# Patient Record
Sex: FEMALE | Race: WHITE
[De-identification: names, ages, dates, MRNs, and addresses within clinical notes are randomized per-mention and may not be internally consistent; named-entity substitution may affect disease eponyms.]

---

## 2019-07-17 ENCOUNTER — HOSPITAL ENCOUNTER (OUTPATIENT)
Dept: HOSPITAL 61 - NM | Age: 74
Discharge: HOME | End: 2019-07-17
Attending: INTERNAL MEDICINE
Payer: MEDICARE

## 2019-07-17 DIAGNOSIS — E11.9: ICD-10-CM

## 2019-07-17 DIAGNOSIS — I25.89: Primary | ICD-10-CM

## 2019-07-17 DIAGNOSIS — I10: ICD-10-CM

## 2019-07-17 PROCEDURE — 78452 HT MUSCLE IMAGE SPECT MULT: CPT

## 2019-07-17 PROCEDURE — A9500 TC99M SESTAMIBI: HCPCS

## 2019-07-18 NOTE — RAD
MR#: [id number]

Account#: [id number]

Accession#: [id number]

Date of Study: 07/18/2019

Ordering Physician: NELLY BEAL, 

Referring Physician: DINO TOBIN Tech: TYRA Caro, ARRT (R) (N)





--------------- APPROVED REPORT --------------





Test Type:          Pharmacological

Stress Nurse/Tech: Lakisha Crain R.N.

Test Indications: dyspnea on exertion, diaphoresis

Cardiac History: Family history, Diabetes, Hypertension

Medications:     See Electronic Medical Record

Medical History: See Electronic Medical Record

Resting ECG:     NSR

Resting Heart Rate: 63 bpm

Resting Blood Pressure: 151/73mmHg

Pretest Chest Pain: No chest pain



Nurse/Tech Notes

S1S2, lungs sound clear

Consent: The procedure was explained to the patient in lay terms. Informed consent was witnessed. Myron
eout was entered into [facility]. History and Stress Test performed by Lakisha Crain R.N.



Pharm. Details

Pharmacologic stress testing was performed using 0.4mg per 5ml of regadenoson given intravenously ove
r 7-10 seconds.



Stress Symptoms

No chest pain or symptoms.  heart rate went to 40 right after lincoln injection



POST EXERCISE

Reason for Termination: Infusion complete

Target HR: 125

Max HR: 97 bpm

Max Blood Pressure: 149/76mmHg

Blood Pressure response to exercise: Normal blood pressure response during stress.

Chest Pain: No. 

Arrhythmia: No. 

ST Change: No. 



INTERPRETATION

Stress EKG Conclusion: Baseline EKG showed sinus rhythm.  No ischemic changes at peak stress.  No arr
hythmias.



Imaging Protocol

IMAGE PROTOCOL: Rest Tc-99m/stress Tc-99m 2 days



Rest:            Stress:         Viability:   

Radiopharm.Tc99m TqovkiknmVq97n Sestamibi

Dose30.9mCi            32mCi            

Img Date  07/17/2019 07/18/2019      

Inj-Img Kxev50evp.           60min.           



Rest Admin Site:IV - Right HandAdministrator:TYRA Caro, ARRT (R)(N)

Stress Admin Site: IV - Left AntecubitalAdministrator: RT Elena (R)(N)



STRESS DATA

End Diast. Vol.109.0mlAv. Heart Rate66.0bpm

End Syst. Vol.32.0mlCO Index BSA0.0L/min

Myocardial Tvme072.0gEject. Gbdpovhm09.0%



Stress Rates

Pk. Fill Rate1.62EDV/secLVtime Pk. Fill 178.51msec

Pk. Empty Rate3.57ESV/secLVtime Pk. Stsyd594.33msec

1/3 Pk. Fill1.28EDV/sec



Stress Scores

Regional WT0.00Summed WT0.00

Regional WM0.00Summed WM0.00



LV Perfusion

scintigraphic images showed small-to-moderate reversible defect involving the anteroapical wall consi
stent with ischemia.



Wall Motion

Normal left ventricle systolic function with ejection fraction calculated at 71%.



LV Perf. Quant

17 Seg. SSS6.00

17 Seg. SRS2.00

17 Seg. SDS4.00

Stress Defect Extent (% LAD)31.30Rest Defect Extent (% LAD)0.00Rev. Defect Extent (% LAD)27.50

Stress Defect Extent (% LCX) 0.00Rest Defect Extent (% LCX)13.80Rev. Defect Extent (% LCX)0.00

Stress Defect Extent (% RCA)0.00Rest Defect Extent (% RCA)0.00Rev. Defect Extent (% RCA)0.00

Stress Defect Extent (% ANTONY)13.50Rest Defect Extent (% ANTONY)2.40Rev. Defect Extent (% ANTONY)11.10



Conclusion

1. Regadenoson cardioisotope stress test showed small-to-moderate amount of anteroapical wall ischemi
a.

2. Normal left ventricular systolic function with ejection fraction calculated at 71%.

3. Intermediate risk for cardiac events.



Signed by : Severo Honeycutt, 

Electronically Approved : 07/18/2019 11:38:31

## 2019-09-18 ENCOUNTER — HOSPITAL ENCOUNTER (OUTPATIENT)
Dept: HOSPITAL 61 - SURG | Age: 74
Setting detail: OBSERVATION
LOS: 1 days | Discharge: HOME | End: 2019-09-19
Attending: INTERNAL MEDICINE | Admitting: INTERNAL MEDICINE
Payer: MEDICARE

## 2019-09-18 VITALS — SYSTOLIC BLOOD PRESSURE: 165 MMHG | DIASTOLIC BLOOD PRESSURE: 87 MMHG

## 2019-09-18 VITALS — DIASTOLIC BLOOD PRESSURE: 93 MMHG | SYSTOLIC BLOOD PRESSURE: 172 MMHG

## 2019-09-18 VITALS — DIASTOLIC BLOOD PRESSURE: 92 MMHG | SYSTOLIC BLOOD PRESSURE: 197 MMHG

## 2019-09-18 VITALS — DIASTOLIC BLOOD PRESSURE: 69 MMHG | SYSTOLIC BLOOD PRESSURE: 131 MMHG

## 2019-09-18 VITALS — SYSTOLIC BLOOD PRESSURE: 132 MMHG | DIASTOLIC BLOOD PRESSURE: 64 MMHG

## 2019-09-18 VITALS — WEIGHT: 289.37 LBS | BODY MASS INDEX: 48.21 KG/M2 | HEIGHT: 65 IN

## 2019-09-18 VITALS — DIASTOLIC BLOOD PRESSURE: 67 MMHG | SYSTOLIC BLOOD PRESSURE: 141 MMHG

## 2019-09-18 VITALS — SYSTOLIC BLOOD PRESSURE: 141 MMHG | DIASTOLIC BLOOD PRESSURE: 83 MMHG

## 2019-09-18 VITALS — SYSTOLIC BLOOD PRESSURE: 155 MMHG | DIASTOLIC BLOOD PRESSURE: 85 MMHG

## 2019-09-18 VITALS — SYSTOLIC BLOOD PRESSURE: 131 MMHG | DIASTOLIC BLOOD PRESSURE: 63 MMHG

## 2019-09-18 VITALS — SYSTOLIC BLOOD PRESSURE: 175 MMHG | DIASTOLIC BLOOD PRESSURE: 81 MMHG

## 2019-09-18 VITALS — DIASTOLIC BLOOD PRESSURE: 88 MMHG | SYSTOLIC BLOOD PRESSURE: 166 MMHG

## 2019-09-18 VITALS — SYSTOLIC BLOOD PRESSURE: 172 MMHG | DIASTOLIC BLOOD PRESSURE: 93 MMHG

## 2019-09-18 VITALS — DIASTOLIC BLOOD PRESSURE: 81 MMHG | SYSTOLIC BLOOD PRESSURE: 169 MMHG

## 2019-09-18 VITALS — DIASTOLIC BLOOD PRESSURE: 67 MMHG | SYSTOLIC BLOOD PRESSURE: 126 MMHG

## 2019-09-18 VITALS — DIASTOLIC BLOOD PRESSURE: 91 MMHG | SYSTOLIC BLOOD PRESSURE: 156 MMHG

## 2019-09-18 VITALS — DIASTOLIC BLOOD PRESSURE: 74 MMHG | SYSTOLIC BLOOD PRESSURE: 147 MMHG

## 2019-09-18 VITALS — SYSTOLIC BLOOD PRESSURE: 129 MMHG | DIASTOLIC BLOOD PRESSURE: 72 MMHG

## 2019-09-18 VITALS — DIASTOLIC BLOOD PRESSURE: 76 MMHG | SYSTOLIC BLOOD PRESSURE: 170 MMHG

## 2019-09-18 DIAGNOSIS — R06.00: ICD-10-CM

## 2019-09-18 DIAGNOSIS — I38: ICD-10-CM

## 2019-09-18 DIAGNOSIS — E78.5: ICD-10-CM

## 2019-09-18 DIAGNOSIS — Z79.899: ICD-10-CM

## 2019-09-18 DIAGNOSIS — R60.0: ICD-10-CM

## 2019-09-18 DIAGNOSIS — I25.10: Primary | ICD-10-CM

## 2019-09-18 DIAGNOSIS — Z98.61: ICD-10-CM

## 2019-09-18 DIAGNOSIS — E11.9: ICD-10-CM

## 2019-09-18 DIAGNOSIS — I10: ICD-10-CM

## 2019-09-18 DIAGNOSIS — Z79.84: ICD-10-CM

## 2019-09-18 DIAGNOSIS — Z79.82: ICD-10-CM

## 2019-09-18 LAB
ANION GAP SERPL CALC-SCNC: 9 MMOL/L (ref 6–14)
BUN SERPL-MCNC: 15 MG/DL (ref 7–20)
CALCIUM SERPL-MCNC: 9.4 MG/DL (ref 8.5–10.1)
CHLORIDE SERPL-SCNC: 106 MMOL/L (ref 98–107)
CO2 SERPL-SCNC: 27 MMOL/L (ref 21–32)
CREAT SERPL-MCNC: 0.9 MG/DL (ref 0.6–1)
ERYTHROCYTE [DISTWIDTH] IN BLOOD BY AUTOMATED COUNT: 15.5 % (ref 11.5–14.5)
GFR SERPLBLD BASED ON 1.73 SQ M-ARVRAT: 61.5 ML/MIN
GLUCOSE SERPL-MCNC: 156 MG/DL (ref 70–99)
HCT VFR BLD CALC: 41.9 % (ref 36–47)
HGB BLD-MCNC: 14.1 G/DL (ref 12–15.5)
MCH RBC QN AUTO: 29 PG (ref 25–35)
MCHC RBC AUTO-ENTMCNC: 34 G/DL (ref 31–37)
MCV RBC AUTO: 85 FL (ref 79–100)
PLATELET # BLD AUTO: 128 X10^3/UL (ref 140–400)
POTASSIUM SERPL-SCNC: 4.2 MMOL/L (ref 3.5–5.1)
PROTHROMBIN TIME: 13.4 SEC (ref 11.7–14)
RBC # BLD AUTO: 4.93 X10^6/UL (ref 3.5–5.4)
SODIUM SERPL-SCNC: 142 MMOL/L (ref 136–145)
WBC # BLD AUTO: 4.7 X10^3/UL (ref 4–11)

## 2019-09-18 PROCEDURE — C1892 INTRO/SHEATH,FIXED,PEEL-AWAY: HCPCS

## 2019-09-18 PROCEDURE — 85027 COMPLETE CBC AUTOMATED: CPT

## 2019-09-18 PROCEDURE — C1874 STENT, COATED/COV W/DEL SYS: HCPCS

## 2019-09-18 PROCEDURE — G0378 HOSPITAL OBSERVATION PER HR: HCPCS

## 2019-09-18 PROCEDURE — C1769 GUIDE WIRE: HCPCS

## 2019-09-18 PROCEDURE — 36415 COLL VENOUS BLD VENIPUNCTURE: CPT

## 2019-09-18 PROCEDURE — 92928 PRQ TCAT PLMT NTRAC ST 1 LES: CPT

## 2019-09-18 PROCEDURE — 96376 TX/PRO/DX INJ SAME DRUG ADON: CPT

## 2019-09-18 PROCEDURE — 93005 ELECTROCARDIOGRAM TRACING: CPT

## 2019-09-18 PROCEDURE — 99153 MOD SED SAME PHYS/QHP EA: CPT

## 2019-09-18 PROCEDURE — 80048 BASIC METABOLIC PNL TOTAL CA: CPT

## 2019-09-18 PROCEDURE — G0379 DIRECT REFER HOSPITAL OBSERV: HCPCS

## 2019-09-18 PROCEDURE — C1725 CATH, TRANSLUMIN NON-LASER: HCPCS

## 2019-09-18 PROCEDURE — 96375 TX/PRO/DX INJ NEW DRUG ADDON: CPT

## 2019-09-18 PROCEDURE — 96366 THER/PROPH/DIAG IV INF ADDON: CPT

## 2019-09-18 PROCEDURE — 96365 THER/PROPH/DIAG IV INF INIT: CPT

## 2019-09-18 PROCEDURE — 85610 PROTHROMBIN TIME: CPT

## 2019-09-18 PROCEDURE — C1887 CATHETER, GUIDING: HCPCS

## 2019-09-18 PROCEDURE — 93454 CORONARY ARTERY ANGIO S&I: CPT

## 2019-09-18 PROCEDURE — 99152 MOD SED SAME PHYS/QHP 5/>YRS: CPT

## 2019-09-18 RX ADMIN — METOPROLOL TARTRATE SCH MG: 25 TABLET ORAL at 21:00

## 2019-09-18 NOTE — EKG
[facility]

              [address] [place] [address]

Test Date:    2019               Test Time:    15:47:29

Pat Name:     MICHELLE MCDOWELL            Department:   

Patient ID:   [id number]           Room:         207 1

Gender:       F                        Technician:   

:          1945               Requested By: NELLY BEAL

Order Number: [id number]           Reading MD:   Dong Maria MD

                                 Measurements

Intervals                              Axis          

Rate:         60                       P:            46

[place]:           168                      QRS:          3

QRSD:         82                       T:            43

QT:           436                                    

QTc:          436                                    

                           Interpretive Statements

SINUS RHYTHM



Electronically Signed On 2019 14:29:47 CDT by Dong Maria MD

## 2019-09-18 NOTE — NUR
The patient, MICHELLE MCDOWELL, 72 y/o, F admitted by NELLY BEAL MD, was given written 
information regarding hospital policies, unit procedures and contact persons.  



Patient arrived to unit via bed from cath lab. TR band and arm board in place. NS infusing 
at 50 mls/hr and nitro gtt at 6 mls/hr. No complaints of chest pain. Patient's spouse at 
bedside. Call light within reach. Will continue to monitor.

## 2019-09-18 NOTE — CARD
MR#: [id number]

Account#: [id number]

Accession#: [id number]

Date of Study: 09/18/2019

Ordering Physician: NELLY LOPES, 

Referring Physician: NELLY LOPES, 

Tech: VIKY CONNAURAYJUDY RTR





--------------- APPROVED REPORT --------------





Procedures 

Selective coronary angiogram 

Bare-metal stent placement to the LAD 



The patient is a 73-year-old female with episodes of progressive dyspnea on exertion. Outpatient work
up included a nuclear stress test that showed reversible ischemia in the anterior septal region. Afte
r discussion a cardiac catheterization was recommended. Risks and benefits were discussed with the ashok baires. She agreed to proceed.



After informed consent was obtained the patient was brought to the heart catheterization lab. The are
a of the right radial artery was prepared in the usual manner after an acceptable Gagandeep's test. A [employer]
ck catheter system was used to enter the right radial artery, a wire placed and a 6 German sheath oseas
nicola over the wire. The standard mixture of antispasm medications and heparin was administered. Using 
a J-wire,  a 6 German JL 3.5 catheter was advanced to the ascending aorta. This would not engage the 
left system cleanly and was replaced with a JL 4 diagnostic catheter which was able to engage the ves
moira and sequential injections in various views were obtained. Using an over-the-wire exchange, a 6 Fr
ench JR4 diagnostic catheter was used to engage the right coronary system and sequential injections i
n various views were obtained. Review of the patient's images showed a subtotal lesion in the mid LAD
. He proceeded to revascularize. 



Angiomax as per protocol was administered. A 6 German XB 3.5 guide was used to engage the left system
 but tended to subselectively engage the left circumflex and clean pictures of the LAD were not able 
to be obtained. It was therefore replaced with a 6 German JL4 guide. This was able to more appropriat
ely engage the LAD. The mid subtotal lesion was crossed with a PT choice wire. It was initially dilat
ed with a 3.5 x 15 mm balloon with a series of 6 inflations to a maximum pressure of 8 kristie for maximu
m time of 15 seconds. Following this a 2.75 x 18 resolute kimberly drug-eluting stent was deployed with o
ne inflation at 16 kristie for 16 seconds. Residual lesion was 0%. The wire and guiding system were remov
ed from the patient. The sheath was removed and the site sealed with a TR band. The patient was moved
 to the holding area in stable condition.



Findings.

Hemodynamics. Aortic root pressure of 164/88.

Coronaries.

Left main. The left main was a very large vessel with a mid 15% lesion.

Left anterior descending. The LAD was a moderate size vessel. It had a very proximal 10-15% lesion. F
ollowing this there appeared to be a previously placed widely patent stent. In its midportion the LAD
 had a subtotal lesion.

Left circumflex. The left circumflex was a very large and dominant vessel. It had a mid to distal les
ion of 15%.

Right coronary artery. The right coronary was a moderate to moderately small vessel. It had a proxima
l 25-30% lesion.







<Conclusion>

Severe single-vessel coronary disease with a subtotal lesion in the mid LAD.

Mild disease in the left circumflex and right coronary arteries.

Successful bare metal stent placement to the mid LAD with a residual lesion of 0%.



Signed by : Nelly Lopes MD

Electronically Approved : 09/18/2019 15:29:19

## 2019-09-19 VITALS — DIASTOLIC BLOOD PRESSURE: 61 MMHG | SYSTOLIC BLOOD PRESSURE: 132 MMHG

## 2019-09-19 VITALS — SYSTOLIC BLOOD PRESSURE: 163 MMHG | DIASTOLIC BLOOD PRESSURE: 76 MMHG

## 2019-09-19 VITALS — SYSTOLIC BLOOD PRESSURE: 158 MMHG | DIASTOLIC BLOOD PRESSURE: 74 MMHG

## 2019-09-19 VITALS — DIASTOLIC BLOOD PRESSURE: 81 MMHG | SYSTOLIC BLOOD PRESSURE: 176 MMHG

## 2019-09-19 VITALS — DIASTOLIC BLOOD PRESSURE: 69 MMHG | SYSTOLIC BLOOD PRESSURE: 164 MMHG

## 2019-09-19 VITALS — DIASTOLIC BLOOD PRESSURE: 90 MMHG | SYSTOLIC BLOOD PRESSURE: 206 MMHG

## 2019-09-19 RX ADMIN — METOPROLOL TARTRATE SCH MG: 25 TABLET ORAL at 08:43

## 2019-09-19 NOTE — DISCH
DISCHARGE INSTRUCTIONS


Condition on Discharge


Condition on Discharge:  Stable





Activity After Discharge


Activity Instructions for Disc:  Activity as tolerated


Bathing Instructions:  Shower-keep dressing dry, No Tub Bath until see  (for 

2 weeks)


Lifting Instructions after Dis:  No heavy lifting, No pulling or pushing, Do not

lift >10 pounds


Driving Instructions after Dis:  Do not drive (for 5 days )


Weight Bearing Status after Di:  As tolerated (see dignosis specific instru

ctions)





Diet after Discharge


Diet after Discharge:  Cardiac





Contacting the  after DC


Call your doctor for:  Concerns you may have











RICH MOHAMUD           Sep 19, 2019 17:02

## 2019-09-19 NOTE — EKG
[facility]

              [address] [place] [address]

Test Date:    2019               Test Time:    07:15:38

Pat Name:     MICHELLE MCDOWELL            Department:   

Patient ID:   [id number]           Room:         207 

Gender:       F                        Technician:   SJ

:          1945               Requested By: NELLY BEAL

Order Number: [id number]           Reading MD:   Severo Honeycutt

                                 Measurements

Intervals                              Axis          

Rate:         58                       P:            59

[place]:           166                      QRS:          14

QRSD:         86                       T:            64

QT:           452                                    

QTc:          443                                    

                           Interpretive Statements

SINUS RHYTHM

ATRIAL PREMATURE COMPLEX(ES)



Electronically Signed On 10-1-2019 16:07:58 CDT by Severo Honeycutt

## 2019-09-19 NOTE — PDOC3
Discharge Summary


Visit Information


Date of Admission:  Sep 18, 2019


Date of Discharge:  Sep 19, 2019


Admitting Diagnosis Comment:


Dyspnea upon exertion 


Hypertension


Hyperlipidemia 


Diabetes, II


Abnormal MPI


Final Diagnosis


CAD


Hypertension


Hyperlipidemia


Diabetes, II





Brief Hospital Course


Allergies





                                    Allergies








Coded Allergies Type Severity Reaction Last Updated Verified


 


  No Known Drug Allergies    7/17/19 No








Vital Signs





Vital Signs








  Date Time  Temp Pulse Resp B/P (MAP) Pulse Ox O2 Delivery O2 Flow Rate FiO2


 


9/19/19 15:38  81  176/81    


 


9/19/19 15:00 98.1  18  96 Room Air  





 98.1       


 


9/19/19 08:00       2.0 








Lab Results





Laboratory Tests








Test


 9/18/19


10:12


 


White Blood Count


 4.7 x10^3/uL


(4.0-11.0)


 


Red Blood Count


 4.93 x10^6/uL


(3.50-5.40)


 


Hemoglobin


 14.1 g/dL


(12.0-15.5)


 


Hematocrit


 41.9 %


(36.0-47.0)


 


Mean Corpuscular Volume 85 fL () 


 


Mean Corpuscular Hemoglobin 29 pg (25-35) 


 


Mean Corpuscular Hemoglobin


Concent 34 g/dL


(31-37)


 


Red Cell Distribution Width


 15.5 %


(11.5-14.5)


 


Platelet Count


 128 x10^3/uL


(140-400)


 


Prothrombin Time


 13.4 SEC


(11.7-14.0)


 


Prothromb Time International


Ratio 1.1 (0.8-1.1) 





 


Sodium Level


 142 mmol/L


(136-145)


 


Potassium Level


 4.2 mmol/L


(3.5-5.1)


 


Chloride Level


 106 mmol/L


()


 


Carbon Dioxide Level


 27 mmol/L


(21-32)


 


Anion Gap 9 (6-14) 


 


Blood Urea Nitrogen


 15 mg/dL


(7-20)


 


Creatinine


 0.9 mg/dL


(0.6-1.0)


 


Estimated GFR


(Cockcroft-Gault) 61.5 





 


Glucose Level


 156 mg/dL


(70-99)


 


Calcium Level


 9.4 mg/dL


(8.5-10.1)








Brief Hospital Course


Ms. Clarke  is a 73 old female who presented to our office with complaints of 

progressive dyspnea upon exertion. Underwent stress test that was notable for a 

small-to-moderate amount of anteroapical wall ischemia. Patient was brought el

ectively for cardiac catheterization, which revealed severe single-vessel 

coronary disease with a subtotal lesion in the mid LAD along with mild disease 

in the left circumflex and right coronary arteries. Underwent successful bare 

metal stent placement to the mid LAD with a residual lesion of 0%. Tolerated 

overnight without acute complications. Blood pressure elevated post-procedure 

and was controlled with nitro gtt. Nitro gtt titrated off following addition or 

oral hypertensives. See med list below for details. No acute events on 

telemetry. Right radial arteriotomy site soft, clean, and dry. No hematoma. 

Bilateral neurovascular status intact. Lungs CTA. Patient discharged in stable 

condition following blood pressure control.





Discharge Information


Condition at Discharge:  Improved


Follow Up:  Weeks (4 weeks with Dr. Nguyen )


Disposition/Orders:  D/C to Home


Scheduled


Aspirin (Aspirin) 81 Mg Tab.chew, 1 TAB PO DAILY for  , #30 Ref 3 (Reported)


   Entered as Reported by: MICHELLE MG RN on 9/18/19 1047


   Last Taken: Unknown Dose on 9/18/19      Last Action: New Order on 9/18/19 1047 by MICHELLE MG RN


Black Cohosh Root Extract (Black Cohosh) 160 Mg Capsule, 160 MG PO DAILY for 

supplement, (Reported)


   Entered as Reported by: MICHELLE MG RN on 9/18/19 1047


   Last Taken: Unknown Dose on 9/18/19      Last Action: New Order on 9/18/19 1047 by MICHELLE MG RN


Calcium Carbonate (Calcium) 500 Mg Tablet, 500 MG PO BID for supplement, 

(Reported)


   Entered as Reported by: MICHELLE MG RN on 9/18/19 1047


   Last Taken: Unknown Dose on 9/18/19      Last Action: New Order on 9/18/19 1047 by MICHELLE MG RN


Cholecalciferol (Vitamin D3) (D3-50) 50,000 Unit Capsule, 1,000 UNIT PO DAILY 

for supplement, (Reported)


   Entered as Reported by: MICHELLE MG RN on 9/18/19 1047


   Last Taken: Unknown Dose on 9/18/19      Last Action: New Order on 9/18/19 1047 by MICHELLE MG RN


Glipizide (Glipizide) 5 Mg Tablet, 0.5 TAB PO DAILY for diabetes, #60 Ref 3 

(Reported)


   Entered as Reported by: MICHELLE MG RN on 9/18/19 1047


   Last Taken: Unknown Dose on 9/17/19      Last Action: New Order on 9/18/19 1047 by MICHELLE MG RN


Glucosamine Sulfate 2KCL (Glucosamine) 1,000 Mg Tablet, 1,000 MG PO DAILY for 

supplement, (Reported)


   Entered as Reported by: MICHELLE MG RN on 9/18/19 1047


   Last Taken: Unknown Dose on 9/17/19      Last Action: New Order on 9/18/19 1047 by MICHELLE MG RN


Levothyroxine Sodium (Levothyroxine Sodium) 88 Mcg Tablet, 1 TAB PO DAILY for 

thyroid, #30 Ref 5 (Reported)


   Entered as Reported by: MICHELLE MG RN on 9/18/19 1047


   Last Taken: Unknown Dose on 9/18/19      Last Action: New Order on 9/18/19 1047 by MICHELLE MG RN


Losartan Potassium (Losartan Potassium **) 25 Mg Tablet, 25 MG PO DAILY for 

HYPERTENSION, (Reported)


   Entered as Reported by: MICHELLE MG RN on 9/18/19 1047


   Last Taken: Unknown Dose on 9/18/19      Last Action: New Order on 9/18/19 1047 by MICHELLE MG RN


Lovastatin (Lovastatin) 40 Mg Tablet, 1 TAB PO DAILY for statin, #30 Ref 5 

(Reported)


   Entered as Reported by: MICHELLE MG RN on 9/18/19 1047


   Last Taken: Unknown Dose on 9/18/19      Last Action: New Order on 9/18/19 1047 by MICHELLE MG RN


Naproxen Sodium (Aleve) 220 Mg Capsule, 220 MG PO BID for pain, (Reported)


   Entered as Reported by: MICHELLE MG RN on 9/18/19 1047


   Last Taken: Unknown Dose on 9/18/19      Last Action: New Order on 9/18/19 1047 by MICHELLE MG RN





Patient Instructions


Patient Instructions


GENERAL INSTRUCTIONS:





1.   Your dressing should be removed prior to leaving the hospital.


2.   It is OK to shower the day after your procedure.


3.   If you received stents, be sure to carry your stent information card with 

you in your wallet/purse at all times.


4.   Call the office immediately at [phone] if you notice any fever or if 

there is redness, worsening tenderness/pain, increased bruising, or drainage    

                             from the puncture site.    


5.   Should you have bleeding from the site, lie down immediately & put pressure

on the site.  The pressure should be hard enough to stop the bleeding. 


       Have the nearest person call 911.  DO NOT try to drive to the ER with 

active bleeding.   


6.   If you notice a change in color, coolness to touch, or loss of feeling in 

the affected extremity, come to the emergency room.  Please have someone  


      drive you or call 911 if no one is available.  DO NOT drive yourself.


7.   If you normally take glucophage (metformin), please do not take this 

medicine for 48 hours following your procedure.


8.   DO NOT STOP TAKING YOUR PLAVIX OR ASPIRIN UNLESS IT IS CLEARED BY A 

REPRESENTATIVE OF YOUR CARDIOLOGIST AT OUR  


      OFFICE.


9.   QUIT SMOKING: the American Heart Association, American Lung Association, & 

American Cancer Society have cessation resources available on  


      their websites


10.   Please have someone available to drive you home from the hospital as you 

may be limited by sedation medications given during the procedure.





Femoral (Groin) access:


1.   Do no lifting, pushing, pulling, bending, stooping, or recurrent stair 

climbing for 3 days following your procedure.


2.   Once past the first 3 days, do not do any HEAVY exertion or lifting for one

week following the procedure.  No gym workouts, running, lifting greater  


      than a gallon of milk, etc


3.   Do not submerge in bath or pool for one week.


       OK to drive 3 days following your procedure, but if going long distance, 

do not go alone & take hourly breaks to get out of car and walk around.





Radial Artery (Wrist) access:


1.   No pushing, pulling, lifting, typing, or anything that requires repetitive 

use/movement of the affected wrist for 3 days following your procedure.


2.   OK to drive the day following your procedure. (This is because of effects 

of sedating medications.)





Call the office at [phone] for any questions or concerns.











RICH MOHAMUD           Sep 19, 2019 17:14

## 2019-09-19 NOTE — NUR
Discharge Note:



MICHELLE MCDOWELL [address] [place] [address]



Discharge instructions and discharge home medications reviewed with Patient and a copy 
given. All questions have been answered and understanding verbalized. 



The following instructions and handouts were given: Heart cath with stent - after care



Discontinued IV line



Patient discharged to home with self care via wheelchair

## 2021-02-13 ENCOUNTER — HOSPITAL ENCOUNTER (EMERGENCY)
Dept: HOSPITAL 61 - ER | Age: 76
LOS: 1 days | Discharge: HOME | End: 2021-02-14
Payer: MEDICARE

## 2021-02-13 VITALS — HEIGHT: 65 IN | WEIGHT: 270.51 LBS | BODY MASS INDEX: 45.07 KG/M2

## 2021-02-13 DIAGNOSIS — L03.116: Primary | ICD-10-CM

## 2021-02-13 LAB
ALBUMIN SERPL-MCNC: 2.9 G/DL (ref 3.4–5)
ALBUMIN/GLOB SERPL: 0.9 {RATIO} (ref 1–1.7)
ALP SERPL-CCNC: 80 U/L (ref 46–116)
ALT SERPL-CCNC: 54 U/L (ref 14–59)
ANION GAP SERPL CALC-SCNC: 8 MMOL/L (ref 6–14)
AST SERPL-CCNC: 41 U/L (ref 15–37)
BASOPHILS # BLD AUTO: 0 X10^3/UL (ref 0–0.2)
BASOPHILS NFR BLD: 0 % (ref 0–3)
BILIRUB SERPL-MCNC: 0.6 MG/DL (ref 0.2–1)
BUN SERPL-MCNC: 14 MG/DL (ref 7–20)
BUN/CREAT SERPL: 14 (ref 6–20)
CALCIUM SERPL-MCNC: 9.2 MG/DL (ref 8.5–10.1)
CHLORIDE SERPL-SCNC: 100 MMOL/L (ref 98–107)
CO2 SERPL-SCNC: 26 MMOL/L (ref 21–32)
CREAT SERPL-MCNC: 1 MG/DL (ref 0.6–1)
EOSINOPHIL NFR BLD: 0.1 X10^3/UL (ref 0–0.7)
EOSINOPHIL NFR BLD: 1 % (ref 0–3)
ERYTHROCYTE [DISTWIDTH] IN BLOOD BY AUTOMATED COUNT: 16.3 % (ref 11.5–14.5)
GFR SERPLBLD BASED ON 1.73 SQ M-ARVRAT: 54.2 ML/MIN
GLUCOSE SERPL-MCNC: 211 MG/DL (ref 70–99)
HCT VFR BLD CALC: 36.5 % (ref 36–47)
HGB BLD-MCNC: 12.3 G/DL (ref 12–15.5)
LYMPHOCYTES # BLD: 0.6 X10^3/UL (ref 1–4.8)
LYMPHOCYTES NFR BLD AUTO: 7 % (ref 24–48)
MCH RBC QN AUTO: 28 PG (ref 25–35)
MCHC RBC AUTO-ENTMCNC: 34 G/DL (ref 31–37)
MCV RBC AUTO: 84 FL (ref 79–100)
MONO #: 0.5 X10^3/UL (ref 0–1.1)
MONOCYTES NFR BLD: 6 % (ref 0–9)
NEUT #: 7.4 X10^3/UL (ref 1.8–7.7)
NEUTROPHILS NFR BLD AUTO: 87 % (ref 31–73)
PLATELET # BLD AUTO: 105 X10^3/UL (ref 140–400)
POTASSIUM SERPL-SCNC: 3.6 MMOL/L (ref 3.5–5.1)
PROT SERPL-MCNC: 6.3 G/DL (ref 6.4–8.2)
RBC # BLD AUTO: 4.34 X10^6/UL (ref 3.5–5.4)
SODIUM SERPL-SCNC: 134 MMOL/L (ref 136–145)
WBC # BLD AUTO: 8.6 X10^3/UL (ref 4–11)

## 2021-02-13 PROCEDURE — 93971 EXTREMITY STUDY: CPT

## 2021-02-13 PROCEDURE — 93005 ELECTROCARDIOGRAM TRACING: CPT

## 2021-02-13 PROCEDURE — 99285 EMERGENCY DEPT VISIT HI MDM: CPT

## 2021-02-13 PROCEDURE — 87040 BLOOD CULTURE FOR BACTERIA: CPT

## 2021-02-13 PROCEDURE — 85007 BL SMEAR W/DIFF WBC COUNT: CPT

## 2021-02-13 PROCEDURE — 85025 COMPLETE CBC W/AUTO DIFF WBC: CPT

## 2021-02-13 PROCEDURE — 36415 COLL VENOUS BLD VENIPUNCTURE: CPT

## 2021-02-13 PROCEDURE — 73630 X-RAY EXAM OF FOOT: CPT

## 2021-02-13 PROCEDURE — 80053 COMPREHEN METABOLIC PANEL: CPT

## 2021-02-13 PROCEDURE — 96374 THER/PROPH/DIAG INJ IV PUSH: CPT

## 2021-02-13 PROCEDURE — 85379 FIBRIN DEGRADATION QUANT: CPT

## 2021-02-13 NOTE — RAD
Exam: Left foot 3 views



INDICATION: Erythema



TECHNIQUE: Frontal, lateral and oblique views of the left foot



Comparisons: None



FINDINGS:

Diffuse soft tissue swelling at the ankle and foot. Bone mineralization is normal. No acute or healed
 fractures. Joint spaces are well-maintained. 



IMPRESSION:

Few soft tissue swelling at the ankle and foot without underlying osseous abnormality identified. Cor
relate for cellulitis.



Electronically signed by: Santino Smyth MD (2/13/2021 9:44 PM) DANICA

## 2021-02-13 NOTE — PHYS DOC
Past Medical History


Smoking Status:  Never Smoker





Adult General


Chief Complaint


Chief Complaint:  LOWER EXTREMITY EDEMA





HPI


HPI





Patient is a 75  year old with a known past medical history includes 

hyperlipidemia, hypertension and diabetes now presents emergency department 

complaining of new onset of left lower extremity pain and swelling.  Patient 

states that she has been having difficulty with what appears to be an infected 

left great toe for the last 2 weeks.  Over the last 48 hours is developed 

worsening sensation of pain and swelling surrounding the entire left lower 

extremity with correlated erythema.  Patient states that she spoke to her 

primary care physician today who recommended her come to the emergency 

department for evaluation.  Patient denies any associated fever, chills, nausea,

vomiting, chest pain or shortness of breath.





Review of Systems


Review of Systems





Constitutional: Denies fever or chills []


Eyes: Denies change in visual acuity, redness, or eye pain []


HENT: Denies nasal congestion or sore throat []


Respiratory: Denies cough or shortness of breath []


Cardiovascular: No additional information not addressed in HPI []


GI: Denies abdominal pain, nausea, vomiting, bloody stools or diarrhea []


:  Denies dysuria or hematuria []


Musculoskeletal: Denies back pain or joint pain []


Integument: Denies rash or skin lesions []


Neurologic: Denies headache, focal weakness or sensory changes []


Endocrine: Denies polyuria or polydipsia []





All other systems were reviewed and found to be within normal limits, except as 

documented in this note.





Current Medications


Current Medications





Current Medications








 Medications


  (Trade)  Dose


 Ordered  Sig/Leana  Start Time


 Stop Time Status Last Admin


Dose Admin


 


 Ceftriaxone Sodium


  (Rocephin)  1 gm  1X  ONCE  2/13/21 21:30


 2/13/21 21:31 DC 2/13/21 21:30


1 GM











Allergies


Allergies





Allergies








Coded Allergies Type Severity Reaction Last Updated Verified


 


  No Known Drug Allergies    7/17/19 No











Physical Exam


Physical Exam





Constitutional: Well developed, well nourished, no acute distress, non-toxic 

appearance. []


HENT: Normocephalic, atraumatic, bilateral external ears normal, oropharynx 

moist, no oral exudates, nose normal. []


Eyes: PERRLA, EOMI, conjunctiva normal, no discharge. [] 


Neck: Normal range of motion, no tenderness, supple, no stridor. [] 


Cardiovascular:Heart rate regular rhythm, no murmur []


Lungs & Thorax:  Bilateral breath sounds clear to auscultation []


Abdomen: Bowel sounds normal, soft, no tenderness, no masses, no pulsatile 

masses. [] 


Skin: Warm, dry, no erythema, no rash. [] 


Back: No tenderness, no CVA tenderness. [] 


Extremities: Significant left lower extremity tenderness] to the mid foreleg 

with erythema, no cyanosis, no clubbing, ROM intact, no edema. [] 


Neurologic: Alert and oriented X 3, normal motor function, normal sensory 

function, no focal deficits noted. []


Psychologic: Affect normal, judgement normal, mood normal. []





Current Patient Data


Lab Values





                                Laboratory Tests








Test


 2/13/21


21:22


 


White Blood Count


 8.6 x10^3/uL


(4.0-11.0)


 


Red Blood Count


 4.34 x10^6/uL


(3.50-5.40)


 


Hemoglobin


 12.3 g/dL


(12.0-15.5)


 


Hematocrit


 36.5 %


(36.0-47.0)


 


Mean Corpuscular Volume


 84 fL ()





 


Mean Corpuscular Hemoglobin 28 pg (25-35)  


 


Mean Corpuscular Hemoglobin


Concent 34 g/dL


(31-37)


 


Red Cell Distribution Width


 16.3 %


(11.5-14.5)  H


 


Platelet Count


 105 x10^3/uL


(140-400)  L


 


Neutrophils (%) (Auto) 87 % (31-73)  H


 


Lymphocytes (%) (Auto) 7 % (24-48)  L


 


Monocytes (%) (Auto) 6 % (0-9)  


 


Eosinophils (%) (Auto) 1 % (0-3)  


 


Basophils (%) (Auto) 0 % (0-3)  


 


Neutrophils # (Auto)


 7.4 x10^3/uL


(1.8-7.7)


 


Lymphocytes # (Auto)


 0.6 x10^3/uL


(1.0-4.8)  L


 


Monocytes # (Auto)


 0.5 x10^3/uL


(0.0-1.1)


 


Eosinophils # (Auto)


 0.1 x10^3/uL


(0.0-0.7)


 


Basophils # (Auto)


 0.0 x10^3/uL


(0.0-0.2)


 


Segmented Neutrophils % 75 % (35-66)  H


 


Band Neutrophils % 12 % (0-9)  H


 


Lymphocytes % 7 % (24-48)  L


 


Monocytes % 5 % (0-10)  


 


Basophils % 1 % (0-3)  


 


Platelet Estimate


 Decreased


(ADEQUATE)


 


D-Dimer (Cecilia)


 1.15 ug/mlFEU


(0.00-0.50)  H


 


Sodium Level


 134 mmol/L


(136-145)  L


 


Potassium Level


 3.6 mmol/L


(3.5-5.1)


 


Chloride Level


 100 mmol/L


()


 


Carbon Dioxide Level


 26 mmol/L


(21-32)


 


Anion Gap 8 (6-14)  


 


Blood Urea Nitrogen


 14 mg/dL


(7-20)


 


Creatinine


 1.0 mg/dL


(0.6-1.0)


 


Estimated GFR


(Cockcroft-Gault) 54.2  





 


BUN/Creatinine Ratio 14 (6-20)  


 


Glucose Level


 211 mg/dL


(70-99)  H


 


Calcium Level


 9.2 mg/dL


(8.5-10.1)


 


Total Bilirubin


 0.6 mg/dL


(0.2-1.0)


 


Aspartate Amino Transferase


(AST) 41 U/L (15-37)


H


 


Alanine Aminotransferase (ALT)


 54 U/L (14-59)





 


Alkaline Phosphatase


 80 U/L


()


 


Total Protein


 6.3 g/dL


(6.4-8.2)  L


 


Albumin


 2.9 g/dL


(3.4-5.0)  L


 


Albumin/Globulin Ratio


 0.9 (1.0-1.7)


L





                                Laboratory Tests


2/13/21 21:22








                                Laboratory Tests


2/13/21 21:22











EKG


EKG


[]





Radiology/Procedures


Radiology/Procedures


[]





Course & Med Decision Making


Course & Med Decision Making


Pertinent Labs and Imaging studies reviewed. (See chart for details)





75F presenting with new onset of left lower extremity pain and swelling is most 

consistent with acute cellulitis.  Will obtain labs to determine the severity of

 the and initiate treatment with IV antibiotics and obtain an x-ray to make sure

 there is no evidence of osteomyelitis.  DVT is also in the differential but is 

less likely, will obtain a D-dimer to rule it out.





Ddimer positive. No signficant leukocytosis. US obtained negative for DVT. Will 

discharge patient on outpatient course of PO abx





Dragon Disclaimer


Dragon Disclaimer


This electronic medical record was generated, in whole or in part, using a voice

 recognition dictation system.





Departure


Departure


Impression:  


   Primary Impression:  


   Cellulitis


Disposition:  01 DC HOME SELF CARE/HOMELESS


Condition:  GOOD


Referrals:  


INOCENCIO LAI MD (PCP)


Patient Instructions:  Cellulitis





Additional Instructions:  


EMERGENCY DEPARTMENT GENERAL DISCHARGE INSTRUCTIONS





Thank you for coming to [facility] Emergency Department (ED) 

today and 


trusting us with you care.  We trust that you had a positive experience in our 

Emergency 


Department.  If you wish to speak to the department management, you may call the

 Director at 


[phone].





YOUR FOLLOW UP INSTRUCTIONS ARE AS FOLLOWS:





1.  Do you have a private Doctor?  If you do not have a private doctor, please 

ask for a 


resource list of physicians or clinics that may be able to assist you with 

follow up care.





2.  The Emergency Physicain has interpreted your x-rays.  The X-Ray specialist 

will also 


review them.  If there is a change in the findings, you will be notified in 48 

hours when at 


all possible.





3.  A lab test or culture has been done, your results will be reviewed and you 

will be 


notified if you need a change in treatment.





ADDITIONAL INSTRUCTIONS AND INFORMATION:





1.  Your care today has been supervised by a physician who is specially trained 

in emergency 


care.  Many problems require more than one evaluation for a complete diagnosis a

nd 


treatment.  We recommend that you schedule your follow up appointment as 

recommended to 


ensure complete treatment of you illness or injury.  If you are unable to obtain

 follow up 


care and continue to have a problem, or if your condition worsens, we recommend 

that you 


return to the ED.





2.  We are not able to safely determine your condition over the phone nor are we

 able to 


give sound medical advice over the phone.  For these safety reasons, if you call

 for medical 


advice we will ask you to come to the ED for further evaluation.





3.  If you have any questions regarding these discharge instructions please call

 the ED at 


[phone].





SAFETY INFORMATION:





In the interest of safety, wellness, and injury prevention; we encourage you to 

wear your 


sealbelt, if you smoke; quite smoking, and we encourage family to use a 

protective helmet 


for bicycling and other sporting events that present an increased risk for head 

injury.





IF YOUR SYMPTOMS WORSEN OR NEW SYMPTOMS DEVELOP, OR YOU HAVE CONCERNS ABOUT YOUR

 CONDITION; 


OR IF YOUR CONDITION WORSENS WHILE YOU ARE WAITING FOR YOUR FOLLOW UP 

APPOINTMENT; EITHER 


CONTACT YOUR PRIMARY CARE DOCTOR, THE PHYSICIAN WHOSE NAME AND NUMBER YOU WERE 

GIVEN, OR 


RETURN TO THE ED IMMEDIATELY.


Scripts


Cephalexin (CEPHALEXIN) 500 Mg Tablet


1 TAB PO QID for 7 Days, #28 TAB


   Prov: ANKIT GAYTAN MD         2/14/21











ANKIT GAYTAN MD              Feb 13, 2021 21:24

## 2021-02-14 VITALS — DIASTOLIC BLOOD PRESSURE: 76 MMHG | SYSTOLIC BLOOD PRESSURE: 133 MMHG

## 2021-02-14 LAB
% BANDS: 12 % (ref 0–9)
% LYMPHS: 7 % (ref 24–48)
% MONOS: 5 % (ref 0–10)
% SEGS: 75 % (ref 35–66)
BASOPHILS NFR BLD AUTO: 1 % (ref 0–3)
PLATELET # BLD EST: (no result) 10*3/UL

## 2021-02-14 NOTE — EKG
[facility]

              [address] [place] [address]

Test Date:    2021               Test Time:    22:24:39

Pat Name:     MICHELLE MCDOWELL            Department:   

Patient ID:   [id number]           Room:          

Gender:       F                        Technician:   

:          1945               Requested By: ANKIT GAYTAN

Order Number: [id number]           Reading MD:     

                                 Measurements

Intervals                              Axis          

Rate:         68                       P:            56

KS:           148                      QRS:          1

QRSD:         82                       T:            48

QT:           382                                    

QTc:          406                                    

                           Interpretive Statements

SINUS RHYTHM

NORMAL ECG

RI6.01

No previous ECG available for comparison

## 2021-02-14 NOTE — RAD
Left lower extremity venous duplex study 2/14/2021



Clinical History: Left leg swelling.



Technique: Using a combination of real time ultrasound imaging and color-flow and pulse Doppler imagi
ng techniques along with graded compression and augmentation, duplex evaluation of the deep venous sy
stem of the left lower extremity was performed. Multiple images were obtained.



Findings: There is no sonographic evidence of deep venous thrombosis involving the visualized deep ve
nous structures of the left lower extremity.



Impression: Negative study.



Electronically signed by: Barney Kat MD (2/14/2021 2:15 AM) [id number]

## 2021-02-19 VITALS — SYSTOLIC BLOOD PRESSURE: 106 MMHG | DIASTOLIC BLOOD PRESSURE: 67 MMHG

## 2021-07-28 ENCOUNTER — HOSPITAL ENCOUNTER (OUTPATIENT)
Dept: HOSPITAL 61 - US | Age: 76
End: 2021-07-28
Attending: INTERNAL MEDICINE
Payer: MEDICARE

## 2021-07-28 DIAGNOSIS — R60.0: ICD-10-CM

## 2021-07-28 DIAGNOSIS — I70.203: Primary | ICD-10-CM

## 2021-07-28 PROCEDURE — 93925 LOWER EXTREMITY STUDY: CPT

## 2021-07-28 PROCEDURE — 93970 EXTREMITY STUDY: CPT

## 2021-07-28 NOTE — RAD
MR#: [id number]

Account#: [id number]

Accession#: [id number]

Date of Study: 07/28/2021

Ordering Physician: NELLY BEAL, 

Referring Physician: NELLY BEAL, 

Tech: Leoncio Kumar MBA, RDMS, RVT, RDCS, RTR





--------------- APPROVED REPORT --------------





Patient Location : OUT-PATIENT



Indications

Lower Extremity Edema :     Bilateral



Findings

Limited grayscale images the bilateral saphenofemoral junctions are grossly unremarkable.  Spectral w
aveforms and color Doppler not reveal any obvious evidence of reflux in the bilateral greater and les
ser saphenous veins.



The right great saphenous vein measures approximately 4 mm in the left great saphenous vein measures 
approximately 6 mm.



Critical Notification

Critical Value: No



<Conclusion>

1.  Negative for reflux in the bilateral greater and lesser saphenous veins



Signed by : Dong Maria, 

Electronically Approved : 07/28/2021 15:12:53

## 2021-07-28 NOTE — RAD
MR#: [id number]

Account#: [id number]

Accession#: [id number]

Date of Study: 07/28/2021

Ordering Physician: NELLY BEAL, 

Referring Physician: NELLY BEAL, 

Tech: Leoncio Kumar MBA, RDMS, RVT, RDCS, RTR





--------------- APPROVED REPORT --------------





Patient Location: OUT-PATIENT



Indications

Rest Pain:Bilaterally                         

Edema                                          



VELOCITY AND DOPPLER WAVEFORM ANALYSIS

RIGHT cm/secWaveformSeverity LEFT cm/secWaveform Severity 

dCFA 141.0TriphasicdCFA 134.0Triphasic

Prof Fem Art. 54.0BiphasicProf Fem Art. 61.0Biphasic

Fem Art Prox. 128.0TriphasicFem Art Prox. 117.0Triphasic

Fem Art Mid. 122.0TriphasicFem Art Mid. 124.0Triphasic

Fem Art Dist. 116.0TriphasicFem Art Dist. 131.0Triphasic

Pop Art(Fossa) 109.0TriphasicPop Art(AK) 105.0Triphasic

PTA Prox. 75.0TriphasicPTA Prox. 88.0Triphasic

PTA Dist. 56.0TriphasicPTA Dist. 101.0Triphasic

Per Art Mid. 66.0TriphasicPer Art Mid. 47.0Triphasic

RADHA Prox. 77.0TriphasicATA Prox. 78.0Triphasic

DPA 97TriphasicDPA 51Triphasic



Findings

Grayscale images the bilateral lower extremity arterial vessels demonstrate mild diffuse atherosclero
sis.



Spectral waveforms and color Doppler are grossly within normal limits without any focal high-grade st
enosis identified.  There are triphasic and biphasic waveforms throughout with three-vessel runoff be
low the knee.



Critical Notification

Critical Value: No



<Conclusion>

1.  No significant lower extremity arterial disease bilaterally



Signed by : Dong Maria, 

Electronically Approved : 07/28/2021 15:13:55

## 2022-05-29 ENCOUNTER — HOSPITAL ENCOUNTER (EMERGENCY)
Dept: HOSPITAL 61 - ER | Age: 77
Discharge: HOME | End: 2022-05-29
Payer: MEDICARE

## 2022-05-29 VITALS — WEIGHT: 288.81 LBS | HEIGHT: 63 IN | BODY MASS INDEX: 51.17 KG/M2

## 2022-05-29 VITALS — SYSTOLIC BLOOD PRESSURE: 124 MMHG | DIASTOLIC BLOOD PRESSURE: 73 MMHG

## 2022-05-29 DIAGNOSIS — E78.00: ICD-10-CM

## 2022-05-29 DIAGNOSIS — E11.9: ICD-10-CM

## 2022-05-29 DIAGNOSIS — E03.9: ICD-10-CM

## 2022-05-29 DIAGNOSIS — I25.10: ICD-10-CM

## 2022-05-29 DIAGNOSIS — I10: ICD-10-CM

## 2022-05-29 DIAGNOSIS — L03.032: Primary | ICD-10-CM

## 2022-05-29 PROCEDURE — 99284 EMERGENCY DEPT VISIT MOD MDM: CPT

## 2022-05-29 NOTE — PHYS DOC
Past Medical History


Past Medical History:  CAD, Diabetes-Type II, High Cholesterol, Hypertension, 

Hypothyroid


Additional Past Medical Histor:  Podiatry ISSUES WITH FEET/TOES


Past Surgical History:  Other


Additional Past Surgical Histo:  STENT


Smoking Status:  Never Smoker


Alcohol Use:  None





General Adult


EDM:


Chief Complaint:  LOWER EXT PAIN





HPI:


HPI:





Patient is a 76 year old female who presents with purulent drainage from the 

great toe of the left foot.  Patient states that she called her podiatrist when 

she noticed purulent drainage on her sock for the second time today.  They 

advised she seek evaluation to obtain a prescription for antibiotics, and that 

they'd follow up with her after the holiday.  Patient denies fever, chills, 

weakness, pain, erythema or any other complaints at this time.





Review of Systems:


Review of Systems:


ROS negative or noncontributory except as mentioned in HPI.





Heart Score:


C/O Chest Pain:  No





Allergies:


Allergies:





Allergies








Coded Allergies Type Severity Reaction Last Updated Verified


 


  No Known Drug Allergies    7/17/19 No











Physical Exam:


PE:





Constitutional: Obese, no acute distress, non-toxic appearance. 


HENT: Normocephalic, atraumatic, bilateral external ears normal, nose normal. 


Eyes: EOMI, conjunctiva normal, no discharge.


Neck: Normal range of motion, no stridor.  


Thorax: Equal thoracic expansion, no increased work of breathing.


Skin: 1 cm area of induration at the distal great toe of the left foot without 

erythema or fluctuance.  Skin otherwise warm, dry, no erythema, no rash. 


Extremities: Bilateral feet callused and multiple areas with thickened and 

yellow toenails.  Extremities otherwise no tenderness, no cyanosis, ROM intact, 

no edema.


Neurologic: Alert and oriented x4, normal motor function, normal sensory 

function, no focal deficits noted.





Current Patient Data:


Vital Signs:





                                   Vital Signs








  Date Time  Temp Pulse Resp B/P (MAP) Pulse Ox O2 Delivery O2 Flow Rate FiO2


 


5/29/22 21:44  56 16 124/73 (90)  Room Air  


 


5/29/22 21:05 97.0 68 20 166/77 (106) 97 Room Air  





 97.0       











Course & Med Decision Making:


Course & Med Decision Making


Pertinent Labs and Imaging studies reviewed. (See chart for details)





Patient prescribed oral course of Keflex for cellulitis of the left great toe.  

Patient already has appointment scheduled with her podiatrist.  She had is 

advised to keep the appointment.  Return precautions were provided.  Patient 

understands and is agreeable to discharge plan.





Dragon Disclaimer:


Dragon Disclaimer:


This electronic medical record was generated, in whole or in part, using a voice

recognition dictation system.





Departure


Departure


Impression:  


   Primary Impression:  


   Cellulitis of great toe of left foot


Disposition:  01 HOME / SELF CARE / HOMELESS


Condition:  STABLE


Referrals:  


INOCENCIO LAI MD (PCP)


Patient Instructions:  Cellulitis, Easy-to-Read





Additional Instructions:  


EMERGENCY DEPARTMENT GENERAL DISCHARGE INSTRUCTIONS





Thank you for coming to [facility] Emergency Department (ED) 

today and trusting us with you care.  We trust that you had a positive 

experience in our Emergency Department.  If you wish to speak to the department 

management, you may call the director at [phone].





YOUR FOLLOW UP INSTRUCTIONS ARE AS FOLLOWS:


1.  Follow up with your primary care doctor. If you do not have a primary 

doctor, please ask for a resource list of physicians or clinics that may be able

to assist you with follow up care.


2.  The emergency provider has interpreted your imaging studies, if any were 

ordered.  The radiology imaging specialist also reviewed them.  If there is a 

change in the findings, you will be notified in 48 hours when at all possible.


3.  If a lab test or culture has been done, your results will be reviewed and 

you will be notified if you need a change in treatment.


4.  Follow instructions verbalized to you and refer to the printouts if needed.





ADDITIONAL INSTRUCTIONS AND INFORMATION:


1.  Your care today has been supervised by a physician who is specially trained 

in emergency care.  Many problems require more than one evaluation for a 

complete diagnosis and treatment.  We recommend that you schedule your follow up

appointment as recommended to ensure complete treatment of you illness or 

injury.  If you are unable to obtain follow up care and continue to have a 

problem, or if your condition worsens, we recommend that you return to the ED.


2.  We are not able to safely determine your condition over the phone nor are we

able to give sound medical advice over the phone.  For these safety reasons, if 

you call for medical advice we will ask you to come to the ED for further 

evaluation.


3.  If you have any questions regarding these discharge instructions please call

the ED at [phone].





SAFETY INFORMATION:


In the interest of safety, wellness, and injury prevention; we encourage you to 

wear your seat belt, if you smoke; quite smoking, and we encourage family to use

a protective helmet for bicycling and other sporting events that present an 

increased risk for head injury.





IF YOUR SYMPTOMS WORSEN OR NEW SYMPTOMS DEVELOP, OR YOU HAVE CONCERNS ABOUT YOUR

CONDITION; OR IF YOUR CONDITION WORSENS WHILE YOU ARE WAITING FOR YOUR FOLLOW UP

APPOINTMENT; EITHER CONTACT YOUR PRIMARY CARE DOCTOR, THE PHYSICIAN WHOSE NAME 

AND NUMBER YOU WERE GIVEN, OR RETURN TO THE ED IMMEDIATELY.


Scripts


Cephalexin (KEFLEX) 500 Mg Capsule


1 CAP PO BID for 5 Days, #10 CAP 0 Refills


   Prov: ZAC CHARLES         5/29/22











ZAC CHARLES            May 29, 2022 21:37